# Patient Record
(demographics unavailable — no encounter records)

---

## 2024-11-11 NOTE — PHYSICAL EXAM
[FreeTextEntry3] : A focused skin examination was performed per patient preference, and the following findings were noted:  nodulocystic and pustular lesion son bl cheeks with interspersed atrophic scarring  chest and back with evidence of scarring and minimally active acne

## 2024-11-11 NOTE — ASSESSMENT
[FreeTextEntry1] : #Acne vulgaris, face, back, chest - nodulocystic, inflammatory, with #scarring severe, chronic, flaring - Reviewed expected time course of improving on topical regimen (can take 6-8 weeks for earliest signs of improvement; 3-6 months should reach maximum anticipated improvement) - c/w OTC BPO wash 5% qAM. SED including dryness, staining of towels, recommend cerave or panoxyl - START tretinoin 0.1% cream nightly. Discussed proper use, including using a pea-sized amount for entire face, starting MWF for 1-2 weeks then increase to nightly as tolerated and liberal use of oil-free, non-comedogenic moisturizer such as Cetaphil or CeraVe. Discussed proper application and side effects including but not limited to irritation and photosensitivity. Discussed pregnancy contraindication. - START doxycycline 100mg BID for 3 months. Discussed side effects including photosensitivity, GI upset, and possible esophageal ulceration. Instructed to take pill with full glass of water before meal without dairy and remain upright for 1 hour after taking medication. - Will consider adding clindamycin 1% lotion BID to flaring areas, after doxy complete - Had extensive discussion about accutane as well however patient hesitant due to side effects.   RTC 3 months

## 2024-11-11 NOTE — HISTORY OF PRESENT ILLNESS
[FreeTextEntry1] : NPV: Acne [de-identified] : AZUCENA VIRGEN is a 15 year old who is presenting for evaluation of:   1. Acne x years. Previously was doing Aerolace laser which completely cleared her skin but now she moved and the place is too far to get to. Pt reports period flares around menses. Has been using tretinoin of unknown % and benzoyl peroxide. Also reports clindamycin works for face.    Social hx: Here with mother who is Kinyar[place] speaking

## 2024-11-11 NOTE — HISTORY OF PRESENT ILLNESS
[FreeTextEntry1] : NPV: Acne [de-identified] : AZUCENA VIRGEN is a 15 year old who is presenting for evaluation of:   1. Acne x years. Previously was doing Aerolace laser which completely cleared her skin but now she moved and the place is too far to get to. Pt reports period flares around menses. Has been using tretinoin of unknown % and benzoyl peroxide. Also reports clindamycin works for face.    Social hx: Here with mother who is Yoruba speaking

## 2025-02-07 NOTE — HISTORY OF PRESENT ILLNESS
[FreeTextEntry1] : RPV: Acne [de-identified] : AZUCENA VIRGEN is a 15 year old who is presenting for evaluation of:   1. Acne x years.  Since LV has completed doxy 100 mg BID x 3 months. Continued on tret 0.1% noted significant improvement, feels skin is clear.  No involvement of chest and back  Hx: Previously was doing Aerolace laser which completely cleared her skin but now she moved and the place is too far to get to. Pt reports period flares around menses. Has been using tretinoin of unknown % and benzoyl peroxide. Also reports clindamycin works for face.    Social hx: Here with mother who is [place] speaking

## 2025-02-07 NOTE — ASSESSMENT
[FreeTextEntry1] : #Acne vulgaris, face, back, chest - hx nodulocystic, inflammatory, with #scarring  severe, chronic, stable at treatment goal. - Reviewed expected time course of improving on topical regimen (can take 6-8 weeks for earliest signs of improvement; 3-6 months should reach maximum anticipated improvement) - c/w OTC BPO wash 5% qAM. SED including dryness, staining of towels, recommend cerave or panoxyl - C/W tretinoin 0.1% cream nightly. Discussed proper use, including using a pea-sized amount for entire face, starting MWF for 1-2 weeks then increase to nightly as tolerated and liberal use of oil-free, non-comedogenic moisturizer such as Cetaphil or CeraVe. Discussed proper application and side effects including but not limited to irritation and photosensitivity. Discussed pregnancy contraindication. - S/P doxycycline 100mg BID for 3 months. - ADD clindamycin 1% lotion BID to flaring areas, now that have completed doxy  - Had extensive discussion about accutane in past as well however patient hesitant due to side effects. Patient clear today does not need to pursue accutane but can consider if flaring again in future  RTC 3 months

## 2025-02-07 NOTE — PHYSICAL EXAM
[FreeTextEntry3] : A focused skin examination was performed per patient preference, and the following findings were noted:  noted significant improvement with acne, overall clear noted minimal erythematous papule R. cheek